# Patient Record
Sex: MALE | Race: WHITE
[De-identification: names, ages, dates, MRNs, and addresses within clinical notes are randomized per-mention and may not be internally consistent; named-entity substitution may affect disease eponyms.]

---

## 2020-08-13 ENCOUNTER — HOSPITAL ENCOUNTER (EMERGENCY)
Dept: HOSPITAL 88 - ER | Age: 25
Discharge: HOME | End: 2020-08-13
Payer: COMMERCIAL

## 2020-08-13 VITALS — WEIGHT: 162 LBS | HEIGHT: 67 IN | BODY MASS INDEX: 25.43 KG/M2

## 2020-08-13 DIAGNOSIS — F41.9: ICD-10-CM

## 2020-08-13 DIAGNOSIS — K21.9: ICD-10-CM

## 2020-08-13 DIAGNOSIS — R10.11: Primary | ICD-10-CM

## 2020-08-13 DIAGNOSIS — F32.9: ICD-10-CM

## 2020-08-13 LAB
ALBUMIN SERPL-MCNC: 4.8 G/DL (ref 3.5–5)
ALBUMIN/GLOB SERPL: 1.8 {RATIO} (ref 0.8–2)
ALP SERPL-CCNC: 58 IU/L (ref 40–150)
ALT SERPL-CCNC: 25 IU/L (ref 0–55)
AMPHETAMINES UR QL SCN>1000 NG/ML: NEGATIVE
AMYLASE SERPL-CCNC: 35 U/L (ref 25–125)
ANION GAP SERPL CALC-SCNC: 12.7 MMOL/L (ref 8–16)
BACTERIA URNS QL MICRO: (no result) /HPF
BASOPHILS # BLD AUTO: 0.1 10*3/UL (ref 0–0.1)
BASOPHILS NFR BLD AUTO: 0.6 % (ref 0–1)
BENZODIAZ UR QL SCN: NEGATIVE
BILIRUB UR QL: NEGATIVE
BUN SERPL-MCNC: 15 MG/DL (ref 7–26)
BUN/CREAT SERPL: 15 (ref 6–25)
CALCIUM SERPL-MCNC: 9.9 MG/DL (ref 8.4–10.2)
CHLORIDE SERPL-SCNC: 103 MMOL/L (ref 98–107)
CLARITY UR: CLEAR
CO2 SERPL-SCNC: 27 MMOL/L (ref 22–29)
COLOR UR: YELLOW
DEPRECATED NEUTROPHILS # BLD AUTO: 5.1 10*3/UL (ref 2.1–6.9)
DEPRECATED RBC URNS MANUAL-ACNC: (no result) /HPF (ref 0–5)
EGFRCR SERPLBLD CKD-EPI 2021: > 60 ML/MIN (ref 60–?)
EOSINOPHIL # BLD AUTO: 0.1 10*3/UL (ref 0–0.4)
EOSINOPHIL NFR BLD AUTO: 1.4 % (ref 0–6)
EPI CELLS URNS QL MICRO: (no result) /LPF
ERYTHROCYTE [DISTWIDTH] IN CORD BLOOD: 13.3 % (ref 11.7–14.4)
GLOBULIN PLAS-MCNC: 2.7 G/DL (ref 2.3–3.5)
GLUCOSE SERPLBLD-MCNC: 88 MG/DL (ref 74–118)
HCT VFR BLD AUTO: 38.5 % (ref 38.2–49.6)
HGB BLD-MCNC: 14.5 G/DL (ref 14–18)
KETONES UR QL STRIP.AUTO: NEGATIVE
LEUKOCYTE ESTERASE UR QL STRIP.AUTO: NEGATIVE
LIPASE SERPL-CCNC: 14 U/L (ref 8–78)
LYMPHOCYTES # BLD: 2.5 10*3/UL (ref 1–3.2)
LYMPHOCYTES NFR BLD AUTO: 28.9 % (ref 18–39.1)
MCH RBC QN AUTO: 35.2 PG (ref 28–32)
MCHC RBC AUTO-ENTMCNC: 37.7 G/DL (ref 31–35)
MCV RBC AUTO: 93.4 FL (ref 81–99)
MONOCYTES # BLD AUTO: 0.8 10*3/UL (ref 0.2–0.8)
MONOCYTES NFR BLD AUTO: 8.9 % (ref 4.4–11.3)
NEUTS SEG NFR BLD AUTO: 60 % (ref 38.7–80)
NITRITE UR QL STRIP.AUTO: NEGATIVE
PCP UR QL SCN: NEGATIVE
PLATELET # BLD AUTO: 158 X10E3/UL (ref 140–360)
POTASSIUM SERPL-SCNC: 3.7 MMOL/L (ref 3.5–5.1)
PROT UR QL STRIP.AUTO: NEGATIVE
RBC # BLD AUTO: 4.12 X10E6/UL (ref 4.3–5.7)
SODIUM SERPL-SCNC: 139 MMOL/L (ref 136–145)
SP GR UR STRIP: 1.01 (ref 1.01–1.02)
UROBILINOGEN UR STRIP-MCNC: 0.2 MG/DL (ref 0.2–1)
WBC #/AREA URNS HPF: (no result) /HPF (ref 0–5)

## 2020-08-13 PROCEDURE — 74177 CT ABD & PELVIS W/CONTRAST: CPT

## 2020-08-13 PROCEDURE — 99284 EMERGENCY DEPT VISIT MOD MDM: CPT

## 2020-08-13 PROCEDURE — 36415 COLL VENOUS BLD VENIPUNCTURE: CPT

## 2020-08-13 PROCEDURE — 80053 COMPREHEN METABOLIC PANEL: CPT

## 2020-08-13 PROCEDURE — 80307 DRUG TEST PRSMV CHEM ANLYZR: CPT

## 2020-08-13 PROCEDURE — 83690 ASSAY OF LIPASE: CPT

## 2020-08-13 PROCEDURE — 81001 URINALYSIS AUTO W/SCOPE: CPT

## 2020-08-13 PROCEDURE — 82150 ASSAY OF AMYLASE: CPT

## 2020-08-13 PROCEDURE — 85025 COMPLETE CBC W/AUTO DIFF WBC: CPT

## 2020-08-13 NOTE — XMS REPORT
[facility] [address] Medical Summary

                             Created on: 2014



YASMEEN UREÑA

External Reference #: [id number]

: 1995

Sex: Male



Demographics





                          Address                   [address]

[place]  [address]

 

                          Home Phone                [phone]

 

                          Preferred Language        English

 

                          Marital Status            Unknown

 

                          Roman Catholic Affiliation     Unknown

 

                          Race                      Unknown

 

                          Ethnic Group              Unknown





Author





                          Author                    YASMEEN COWAN TEMI

 

                          Organization              Unknown

 

                          Address                   Unknown

 

                          Phone                     [phone]







Support





                Name            Relationship    Address         Phone

 

                    TEJAS UREÑA         ECON                ,

Unknown                                 [phone]

 

                    SAMEER UREÑA        PRS                 [address]

Unknown                                 [phone]







Care Team Providers





                    Care Team Member Name Role                Phone

 

                    TEMI COWAN     PP                  [phone]

 

                          Unavailable               [phone]







Reason for Referral

No Reason for Referral was given.



History of Present Illness

No HPI available.



Problems

* Normal Routine History And Physical Adolescent (12 - 17) (V20.2);        (
  Active)    

* Acute Bronchitis (466.0);        (Active)    

* Fatigue (780.79);        (Active)    





Medication

* Ibuprofen TABS; as needed (Active)

* Tylenol TABS; as needed (Active)





Allergies and Adverse Reactions

* No Known Drug Allergies (Active)





Past Medical History

* History of Fatigue (780.79);        (Resolved)    





Procedures





                Procedure       Procedure Date  Date Completed  Status

 

                Prior Surgical Procedure Not Done -               -             

  Resolved







Family History

* Maternal history of Hypertension (V17.49);        (Active)    

* Maternal history of Cancer       (Active)    

* Sororal history of Diabetes Mellitus (V18.0);        (Active)    





Social History

* Never A Smoker       (Active)    

* Never Drank Alcohol       (Active)    

* Never Used Drugs       (Active)    

* Occupation: Comments: STUDENT       (Active)    

* Marital History - Single       (Active)    





Advance Directives

* No Advance Directives available.





Encounters

* AUDIT 2014

## 2020-08-13 NOTE — XMS REPORT
[facility] [address] Medical Summary

                             Created on: 2014



YASMEEN UREÑA

External Reference #: [id number]

: 1995

Sex: Male



Demographics





                          Address                   [address]

[place]  [address]

 

                          Home Phone                [phone]

 

                          Preferred Language        English

 

                          Marital Status            Unknown

 

                          Mandaen Affiliation     Unknown

 

                          Race                      Unknown

 

                          Ethnic Group              Unknown





Author





                          Author                    YASMEEN COWAN TEMI

 

                          Organization              Unknown

 

                          Address                   Unknown

 

                          Phone                     [phone]







Support





                Name            Relationship    Address         Phone

 

                    TEJAS UREÑA         ECON                ,

Unknown                                 [phone]

 

                    SAMEER UREÑA        PRS                 [address]^[address]

Unknown                                 [phone]







Care Team Providers





                    Care Team Member Name Role                Phone

 

                    TEMI COWAN     PP                  [phone]

 

                          Unavailable               [phone]







Reason for Referral

No Reason for Referral was given.



History of Present Illness

No HPI available.



Problems

* Normal Routine History And Physical Adolescent (12 - 17) (V20.2);        (
  Active)    

* Acute Bronchitis (466.0);        (Active)    

* Fatigue (780.79);        (Active)    





Medication

* Ibuprofen TABS; as needed (Active)

* Tylenol TABS; as needed (Active)





Allergies and Adverse Reactions

* No Known Drug Allergies (Active)





Past Medical History

* History of Fatigue (780.79);        (Resolved)    





Procedures





                Procedure       Procedure Date  Date Completed  Status

 

                Prior Surgical Procedure Not Done -               -             

  Resolved







Family History

* Maternal history of Hypertension (V17.49);        (Active)    

* Maternal history of Cancer       (Active)    

* Sororal history of Diabetes Mellitus (V18.0);        (Active)    





Social History

* Never A Smoker       (Active)    

* Never Drank Alcohol       (Active)    

* Never Used Drugs       (Active)    

* Occupation: Comments: STUDENT       (Active)    

* Marital History - Single       (Active)    





Treatment Plan

* [QL] HETEROPHILE, MONO SCREEN 2014 Routine

* [QLH] SED RATE BY MODIFIED WESTERGREN 2014 Routine

* [L] STACEY w/Reflex if Positive 2014 Routine

* [QLH] RHEUMATOID FACTOR 2014 Routine

* [Q] CBC (INCLUDES DIFF/PLT) WITH SMEAR REVIEW 2014 Routine

* [QLH] CMP W/EGFR 2014 Routine





Advance Directives

* No Advance Directives available.





Encounters

* AUDIT 2014

## 2020-08-13 NOTE — DIAGNOSTIC IMAGING REPORT
EXAM: CT Abdomen and Pelvis WITH intravenous contrast  



INDICATION: Abdominal pain



COMPARISON: None.



TECHNIQUE: Abdomen and pelvis were scanned utilizing a multidetector helical

scanner from the lung base to the pubic symphysis after administration of IV

contrast. Coronal and sagittal reformations were obtained. Routine protocol was

performed. Scan was performed during portal venous phase.

     

IV CONTRAST: 100mL of Isovue 370

ORAL CONTRAST: Water



RADIATION DOSE:

Total DLP:  304 mGy*cm



Dose modulation, iterative reconstruction, and/or weight based adjustment of

the mA/kV was utilized to reduce the radiation dose to as low as reasonably

achievable. 



FINDINGS:

LOWER THORAX: Normal.



HEPATOBILIARY: Diffuse hepatic steatosis. 7 mm hypodense focus in the posterior

right liver is too small to adequately characterize and may represent a cyst.

No additional focal liver lesions. No biliary ductal dilation. Unremarkable

gallbladder.



SPLEEN: No splenomegaly.



PANCREAS: No focal masses or ductal dilatation.



ADRENALS: No adrenal nodules.

KIDNEYS/URETERS: No hydronephrosis, stones, or solid mass lesions.

PELVIC ORGANS/BLADDER: Unremarkable.



PERITONEUM / RETROPERITONEUM: No free air or fluid.

LYMPH NODES: No lymphadenopathy.

VESSELS: Unremarkable.



GI TRACT: Minimal diverticulosis. No CT evidence of diverticulitis. No abnormal

bowel thickening. No bowel obstruction. Normal appendix.



BONES AND SOFT TISSUES: No acute osseous injury. No suspicious lytic or blastic

lesions. Soft tissues appear unremarkable.



IMPRESSION: 

Diffuse hepatic steatosis. Otherwise, no acute findings in the abdomen or

pelvis.





Signed by: Josi Sims MD on 8/13/2020 8:57 AM

## 2020-08-13 NOTE — XMS REPORT
Pediatric Rheumatology Medical Summary

                             Created on: 2014



KAILYN UREÑADORENE VELASCO

External Reference #: [id number]

: 1995

Sex: Male



Demographics





                          Address                   [address]

[place]  [address]

 

                          Home Phone                [phone]

 

                          Preferred Language        English

 

                          Marital Status            Unknown

 

                          Jainism Affiliation     Unknown

 

                          Race                      Unknown

 

                          Ethnic Group              Unknown





Author





                          Author                    YASMEEN COLE

 

                          Organization              Unknown

 

                          Address                   Unknown

 

                          Phone                     [phone]







Support





                Name            Relationship    Address         Phone

 

                    TEJAS(MOM) ADELITA CHANDLER                ,

Unknown                                 [phone]

 

                    SAMEER UREÑA        PRS                 [address]

Unknown                                 [phone]







Care Team Providers





                    Care Team Member Name Role                Phone

 

                    JOAQUIN COLEUR       PP                  [phone]

 

                          Unavailable               [phone]







Reason for Referral

No Reason for Referral was given.



History of Present Illness

No HPI available.



Problems

* Normal Routine History And Physical Adolescent (12 - 17) (V20.2);        (
  Active)    

* Acute Bronchitis (466.0);        (Active)    

* Fatigue (780.79);        (Active)    

* Arthralgias In Multiple Sites (719.49);        (Active)    





Medication

* Ibuprofen TABS; as needed (Active)

* Tylenol TABS; as needed (Active)





Allergies and Adverse Reactions

* No Known Drug Allergies (Active)





Past Medical History

* History of Fatigue (780.79);        (Resolved)    





Procedures





                Procedure       Procedure Date  Date Completed  Status

 

                Prior Surgical Procedure Not Done -               -             

  Resolved







Family History

* Maternal history of Hypertension (V17.49);        (Active)    

* Maternal history of Cancer       (Active)    

* Sororal history of Diabetes Mellitus (V18.0);        (Active)    





Social History

* Never A Smoker       (Active)    

* Never Drank Alcohol       (Active)    

* Never Used Drugs       (Active)    

* Occupation: Comments: STUDENT       (Active)    

* Marital History - Single       (Active)    





Advance Directives

* No Advance Directives available.





Encounters

* AUDIT 2014

## 2020-08-13 NOTE — XMS REPORT
Continuity of Care Document

                             Created on: 2020



YASMEEN UREÑA

External Reference #: [id number]

: 1995

Sex: Male



Demographics





                          Address                   [address] [place] [address]  [address]

 

                          Home Phone                [phone]

 

                          Preferred Language        English

 

                          Marital Status            Unknown

 

                          Holiness Affiliation     Unknown

 

                          Race                      Unknown

 

                          Ethnic Group              Unknown





Author





                          Author                    [employer]

 YASMEEN Briceño

 

                          Organization              [employer]

 

                          Address                   Unknown

 

                          Phone                     Unavailable







Care Team Providers





                    Care Team Member Name Role                Phone

 

                    [employer] Unavailable         Un

available



                                    



Problems

                    



                    Problem                         Status                      

   Onset Date       

                          Classification                         Date Reported  

         

                          Comments                         Source               

     

 

                    Acute Bronchitis                         Active             

                    

                                             2014                         

    

                                        [facility]                    

 

                    Fatigue                         Active                      

                    

                                             2014                         

             

                                        [facility]                    

 

                          Arthralgias In Multiple Sites                         

Active                    

                                                                      2014

                

                                                    [facility]               

     



                                                                                
                       



Medications

                    



                    Medication                         Details                  

       Route        

                          Status                         Patient Instructions   

          

                          Ordering Provider                         Order Date  

               

                                        Source                    

 

                    Ibuprofen TABS                          (Active)            

                    

                    Active                                                      

   

                                                    [facility]               

     

 

                    Tylenol TABS                          (Active)              

                    

                    Active                                                      

     

                                                    [facility]               

     



                                                                                
                   



Allergies, Adverse Reactions, Alerts

                    



                    Substance                         Category                  

       Reaction     

                          Severity                         Reaction type        

       

                    Status                         Date Reported                

         

Comments                                Source                    

 

                          No Known Drug Allergies                         drug a

llergy                    

                                                                      drug aller

gy              

                    Active                                                      

          

                                        [facility]                    



                                                        



Immunizations

        



                                        No Data Provided for This Section



                                     



Results





                                        No Data Provided for This Section



                    



Pathology Reports





                                        No Data Provided for This Section       

             



                            



Diagnostic Reports

            



                                        No Data Provided for This Section       

             



                                                            



Consultation Notes

                    



                                        No Data Provided for This Section       

             



                                                            



Discharge Summaries

                    



                                        No Data Provided for This Section       

             



                                                            



History and Physicals

                    



                                        No Data Provided for This Section       

             



                                                                



Vital Signs

                         



                                        No Data Provided for This Section



                                                                 



Encounters

                    



                    Location                         Location Details           

              

Encounter Type                         Encounter Number                         

Reason For Visit                         Attending Provider                     

                    ADM Date                         DC Date                    

     Status      

                                        Source                    

 

                                                                  AUDIT         

                

[id number]                                                                        

 

                    2014               

                

                                        [facility]                    

 

                                                                  AUDIT         

                

[id number]                                                                        

 

                    2014               

                

                                        [facility]                    

 

                                                                  AUDIT         

                

[id number]                                                                        

 

                    2014               

                

                                        [facility]                    

 

                                                                  AUDIT         

                

[id number]                                                                        

 

                    2014               

                

                                        [facility]                    



                                                                                
                           



Procedures

        



                                        No Data Provided for This Section



                                                    



Assessment and Plan

                    



                                        No Data Provided for This Section       

             



                                    



Plan of Care





                    Plan of Care        Date                Source

 

                                        [QL] HETEROPHILE, MONO SCREEN 2014

 Routine[QLH] SED RATE BY MODIFIED 

ARGELIAREN 2014 Routine[L] STACEY w/Reflex if Positive 2014 
Routine[QLH] RHEUMATOID FACTOR 2014 Routine[Q] CBC (INCLUDES DIFF/PLT) 
WITH SMEAR REVIEW 2014 Routine[QLH] CMP W/EGFR 2014 Routine         
                          2014                         [facility]      

          

    



                                                                        



Social History

                    



                    Social History                         Date                 

        Source      

              

 

                                        Never A Smoker        (Active)     Never

 Drank Alcohol        (Active)     Never

 Used Drugs        (Active)     Occupation: Comments: STUDENT        (Active)   
  Marital History - Single        (Active)                              

2014                              [facility]                    



                                                                        



Family History

                    



                    Value                         Date                         S

ource               

     

 

                                        Maternal history of Hypertension (V17.49

);         (Active)     Maternal history

 of Cancer        (Active)     Sororal history of Diabetes Mellitus (V18.0);    
     (Active)                              2014                         [facility]                    

 

                                        Maternal history of Hypertension (V17.49

);         (Active)     Maternal history

 of Cancer        (Active)     Sororal history of Diabetes Mellitus (V18.0);    
     (Active)                              2014                         [facility]                    

 

                                        Maternal history of Hypertension (V17.49

);         (Active)     Maternal history

 of Cancer        (Active)     Sororal history of Diabetes Mellitus (V18.0);    
     (Active)                              2014                         [facility]                    

 

                                        Maternal history of Hypertension (V17.49

);         (Active)     Maternal history

 of Cancer        (Active)     Sororal history of Diabetes Mellitus (V18.0);    
     (Active)                              2014                         [facility]                    



                                                                                
                                        



Advance Directives

                    



                    Order Name                         Results                  

       Value        

                          Date                         Source                   

 

 

                          Advance Directives                         Advance Dir

ectives                   

                          No Advance Directives available.                      

   2014       

                                        [facility]                    

 

                          Advance Directives                         Advance Dir

ectives                   

                          No Advance Directives available.                      

   2014       

                                        [facility]                    

 

                          Advance Directives                         Advance Dir

ectives                   

                          No Advance Directives available.                      

   2014       

                                        [facility]                    

 

                          Advance Directives                         Advance Dir

ectives                   

                          No Advance Directives available.                      

   2014       

                                        [facility]                    



                                                                                
                                    



Functional Status

                    



                                        No Data Provided for This Section

## 2020-08-13 NOTE — XMS REPORT
[facility] Medical Summary

                             Created on: 2014



YASMEEN UREÑA

External Reference #: [id number]

: 1995

Sex: Male



Demographics





                          Address                   [address]

[place]  [address]

 

                          Home Phone                [phone]

 

                          Preferred Language        English

 

                          Marital Status            Unknown

 

                          Yazdanism Affiliation     Unknown

 

                          Race                      Unknown

 

                          Ethnic Group              Unknown





Author





                          Author                    YASMEEN Childs

 

                          Organization              Unknown

 

                          Address                   Unknown

 

                          Phone                     [phone]







Support





                Name            Relationship    Address         Phone

 

                    TEJAS UREÑA         [employer]                ,

Unknown                                 [phone]

 

                    SAMEER UREÑA        PR[employer]                 [address]

Unknown                                 [phone]







Care Team Providers





                    Care Team Member Name Role                Phone

 

                    Mary Jane Childs     PP                  [phone]

 

                          Unavailable               [phone]







Reason for Referral

No Reason for Referral was given.



History of Present Illness

No HPI available.



Problems

* Normal Routine History And Physical Adolescent (12 - 17) (V20.2);        (
  Active)    

* Acute Bronchitis (466.0);        (Active)    

* Fatigue (780.79);        (Active)    





Medication

* Ibuprofen TABS; as needed (Active)

* Tylenol TABS; as needed (Active)





Allergies and Adverse Reactions

* No Known Drug Allergies (Active)





Past Medical History

* History of Fatigue (780.79);        (Resolved)    





Procedures





                Procedure       Procedure Date  Date Completed  Status

 

                Prior Surgical Procedure Not Done -               -             

  Resolved







Family History

* Maternal history of Hypertension (V17.49);        (Active)    

* Maternal history of Cancer       (Active)    

* Sororal history of Diabetes Mellitus (V18.0);        (Active)    





Social History

* Never A Smoker       (Active)    

* Never Drank Alcohol       (Active)    

* Never Used Drugs       (Active)    

* Occupation: Comments: STUDENT       (Active)    

* Marital History - Single       (Active)    





Advance Directives

* No Advance Directives available.





Encounters

* AUDIT 2014

## 2020-08-13 NOTE — EMERGENCY DEPARTMENT NOTE
History of Present Illnes


History of Present Illness


Chief Complaint:  Abdominal Complaints


History of Present Illness


This is a 25 year old  male arrives to the ED with complaint of chronic 

abdominal pain for several years. Patient states he had a colonoscopy and 

endoscopy done last month which revealed acid reflux.


Historian:  Patient


Arrival Mode:  Car


Onset (how long ago):  year(s)


Duration (how long):  month(s)


Relieving factors:  none





Past Medical/Family History


Physician Review


I have reviewed the patient's past medical and family history.  Any updates have

been documented here.





Past Medical History


Recent Fever:  No


Clinical Suspicion of Infectio:  No


New/Unexplained Change in Ment:  No





Review of Systems


Review of Systems


Constitutional:  Reports no symptoms


EENTM:  Reports no symptoms


Cardiovascular:  Reports no symptoms


Respiratory:  Reports no symptoms


Gastrointestinal:  Reports as per HPI, Reports abdominal pain


Genitourinary:  Reports no symptoms


Musculoskeletal:  Reports no symptoms


Integumentary:  Reports no symptoms


Neurological:  Reports no symptoms


Psychological:  Reports no symptoms


Endocrine:  Reports no symptoms


Hematological/Lymphatic:  Reports no symptoms





Physical Exam


Related Data


Allergies:  


Coded Allergies:  


     No Known Allergies (Unverified , 8/13/20)


Triage Vital Signs





Vital Signs








  Date Time  Temp Pulse Resp B/P (MAP) Pulse Ox O2 Delivery O2 Flow Rate FiO2


 


8/13/20 05:51 98.6 71 17 111/72 100 Room Air  








Vital signs reviewed:  Yes





Physical Exam


CONSTITUTIONAL





Constitutional:  Present well-developed, Present well-nourished


HENT


HENT:  Present normocephalic, Present atraumatic, Present oropharynx clear/mois

t, Present nose normal


HENT L/R:  Present left ext ear normal, Present right ext ear normal


EYES





Eyes:  Reports PERRL, Reports conjunctivae normal


NECK


Neck:  Present ROM normal


PULMONARY


Pulmonary:  Present effort normal, Present breath sounds normal


CARDIOVASCULAR





Cardiovascular:  Present regular rhythm, Present heart sounds normal, Present 

capillary refill normal, Present normal rate


GASTROINTESTINAL





Abdominal:  Present soft, Present nontender, Present bowel sounds normal


GENITOURINARY





Genitourinary:  Present exam deferred


SKIN


Skin:  Present warm, Present dry


MUSCULOSKELETAL





Musculoskeletal:  Present ROM normal


NEUROLOGICAL





Neurological:  Present alert, Present oriented x 3, Present no gross motor or 

sensory deficits


PSYCHOLOGICAL


Psychological:  Present mood/affect normal, Present judgement normal





Results


Laboratory


Lab results reviewed:  Yes


Laboratory comments





Laboratory Tests








Test


 8/13/20


05:58


 


White Blood Count


 8.52 x10e3/uL


(4.8-10.8)


 


Red Blood Count


 4.12 x10e6/uL


(4.3-5.7)


 


Hemoglobin


 14.5 g/dL


(14.0-18.0)


 


Hematocrit


 38.5 %


(38.2-49.6)


 


Mean Corpuscular Volume


 93.4 fL


(81-99)


 


Mean Corpuscular Hemoglobin


 35.2 pg


(28-32)


 


Mean Corpuscular Hemoglobin


Concent 37.7 g/dL


(31-35)


 


Red Cell Distribution Width


 13.3 %


(11.7-14.4)


 


Platelet Count


 158 x10e3/uL


(140-360)


 


Neutrophils (%) (Auto)


 60.0 %


(38.7-80.0)


 


Lymphocytes (%) (Auto)


 28.9 %


(18.0-39.1)


 


Monocytes (%) (Auto)


 8.9  %


(4.4-11.3)


 


Eosinophils (%) (Auto)


 1.4 %


(0.0-6.0)


 


Basophils (%) (Auto)


 0.6 %


(0.0-1.0)


 


Neutrophils # (Auto) 5.1 (2.1-6.9) 


 


Lymphocytes # (Auto) 2.5 (1.0-3.2) 


 


Monocytes # (Auto) 0.8 (0.2-0.8) 


 


Eosinophils # (Auto) 0.1 (0.0-0.4) 


 


Basophils # (Auto) 0.1 (0.0-0.1) 


 


Absolute Immature Granulocyte


(auto 0.02 x10e3/uL


(0-0.1)


 


Urine Color


 Yellow


(YELLOW)


 


Urine Clarity Clear (CLEAR) 


 


Urine pH 6.5 (5 - 7) 


 


Urine Specific Gravity


 1.015


(1.010-1.025)


 


Urine Protein


 Negative


(NEGATIVE)


 


Urine Glucose (UA)


 Negative


(NEGATIVE)


 


Urine Ketones


 Negative


(NEGATIVE)


 


Urine Blood


 Negative


(NEGATIVE)


 


Urine Nitrite


 Negative


(NEGATIVE)


 


Urine Bilirubin


 Negative


(NEGATIVE)


 


Urine Urobilinogen


 0.2 mg/dL (0.2


- 1)


 


Urine Leukocyte Esterase


 Negative


(NEGATIVE)


 


Urine RBC 0-5 /HPF (0-5) 


 


Urine WBC 0-5 /HPF (0-5) 


 


Urine Epithelial Cells


 Few /LPF


(NONE)


 


Urine Bacteria


 Rare /HPF


(NONE)


 


Sodium Level


 139 mmol/L


(136-145)


 


Potassium Level


 3.7 mmol/L


(3.5-5.1)


 


Chloride Level


 103 mmol/L


()


 


Carbon Dioxide Level


 27 mmol/L


(22-29)


 


Anion Gap


 12.7 mmol/L


(8-16)


 


Blood Urea Nitrogen


 15 mg/dL


(7-26)


 


Creatinine


 1.02 mg/dL


(0.72-1.25)


 


Estimat Glomerular Filtration


Rate > 60 ML/MIN


(60-)


 


BUN/Creatinine Ratio 15 (6-25) 


 


Glucose Level


 88 mg/dL


()


 


Calcium Level


 9.9 mg/dL


(8.4-10.2)


 


Total Bilirubin


 1.2 mg/dL


(0.2-1.2)


 


Aspartate Amino Transf


(AST/SGOT) 24 IU/L (5-34) 





 


Alanine Aminotransferase


(ALT/SGPT) 25 IU/L (0-55) 





 


Alkaline Phosphatase


 58 IU/L


()


 


Total Protein


 7.5 g/dL


(6.5-8.1)


 


Albumin


 4.8 g/dL


(3.5-5.0)


 


Globulin


 2.7 g/dL


(2.3-3.5)


 


Albumin/Globulin Ratio 1.8 (0.8-2.0) 


 


Amylase Level


 35 U/L


()


 


Lipase 14 U/L (8-78) 


 


Urine Opiates Screen


 Negative


(NEGATIVE)


 


Urine Methadone Screen


 Negative


(NEGATIVE)


 


Urine Barbiturates Screen


 Negative


(NEGATIVE)


 


Urine Phencyclidine Screen


 Negative


(NEGATIVE)


 


Urine Amphetamines Screen


 Negative


(NEGATIVE)


 


Urine Methamphetamines Screen


 Negative


(NEGATIVE)


 


Urine Benzodiazepines Screen


 Negative


(NEGATIVE)


 


Urine Cocaine Screen


 Negative


(NEGATIVE)


 


Urine Cannabinoids Screen


 Positive


(NEGATIVE)











Imaging


Imaging results reviewed:  Yes





Assessment & Plan


Medical Decision Making


MDM


This patient presents with abdominal pain of unclear etiology. A CT scan was 

performed to evaluate for potential causes of the abdominal pain, however, 

neither the clinical exam nor the CT has identified an emergent etiology for the

abdominal pain. Specifically, given the benign exam, the laboratory studies, and

unremarkable CT, I have a very low suspicion for appendicitis, ischemic bowel, 

bowel perforation, or any other life threatening disease. I have discussed with 

the patient the level of uncertainty with undifferentiated abdominal pain and 

clearly explained the need to follow-up as noted on the discharge instructions, 

or return to the Emergency Department immediately if the pain worsens, develops 

fever, persistent and uncontrollable vomiting, or for any new symptoms or 

concerns.





Assessment & Plan


Final Impression:  


(1) Abdominal pain


Depart Disposition:  HOME, SELF-CARE


Last Vital Signs











  Date Time  Temp Pulse Resp B/P (MAP) Pulse Ox O2 Delivery O2 Flow Rate FiO2


 


8/13/20 05:51 98.6 71 17 111/72 100 Room Air  








Medications in the ED





Prochlorperazine Maleate 10 mg Q6HR  PRN PO NAUSEA AND VOMITING;  Start 8/13/20 

at 06:00;  Stop 8/13/20 at 06:01;  Status DC











JULIAN GUEVARA DO            Aug 13, 2020 06:20